# Patient Record
Sex: MALE | Race: WHITE | NOT HISPANIC OR LATINO | Employment: FULL TIME | URBAN - METROPOLITAN AREA
[De-identification: names, ages, dates, MRNs, and addresses within clinical notes are randomized per-mention and may not be internally consistent; named-entity substitution may affect disease eponyms.]

---

## 2019-01-20 ENCOUNTER — DOCUMENTATION (OUTPATIENT)
Dept: URGENT CARE | Facility: MEDICAL CENTER | Age: 39
End: 2019-01-20

## 2019-01-20 ENCOUNTER — AMB VIDEO VISIT (OUTPATIENT)
Dept: OTHER | Facility: HOSPITAL | Age: 39
End: 2019-01-20
Payer: COMMERCIAL

## 2019-01-20 DIAGNOSIS — B02.9 HERPES ZOSTER WITHOUT COMPLICATION: Primary | ICD-10-CM

## 2019-01-20 PROCEDURE — 99201 PR OFFICE OUTPATIENT NEW 10 MINUTES: CPT | Performed by: PHYSICIAN ASSISTANT

## 2019-01-20 RX ORDER — VALACYCLOVIR HYDROCHLORIDE 1 G/1
1000 TABLET, FILM COATED ORAL 3 TIMES DAILY
Qty: 21 TABLET | Refills: 0 | Status: SHIPPED | OUTPATIENT
Start: 2019-01-20 | End: 2019-01-27

## 2019-01-20 NOTE — PROGRESS NOTES
[facility]        NAME: Mary Lou Rosales is a 41 y o  male  : 1980    MRN: [id number]  DATE: 2019  TIME: 12:20 PM    Assessment and Plan   Herpes zoster without complication [B34 8]  1  Herpes zoster without complication  valACYclovir (VALTREX) 1,000 mg tablet         Patient Instructions       Follow up with PCP in 3-5 days  Proceed to  ER if symptoms worsen  Chief Complaint   No chief complaint on file  History of Present Illness       25-year-old male presents with rash on left upper chest area  Symptoms started couple days ago and have been persistent  Reports rashes starts around on as axillary and wraps around onto his front of his chest   Reports that it does not cross midline  Is painful and irritated  Denies any fevers chills nausea vomiting diarrhea  Rash   This is a new problem  The current episode started yesterday  The problem has been gradually worsening since onset  The affected locations include the chest  The rash is characterized by blistering, pain and redness  He was exposed to nothing  Pertinent negatives include no cough, fever or shortness of breath  Past treatments include nothing  The treatment provided no relief  Review of Systems   Review of Systems   Constitutional: Negative  Negative for fever  HENT: Negative  Eyes: Negative  Respiratory: Negative  Negative for cough and shortness of breath  Cardiovascular: Negative  Gastrointestinal: Negative  Musculoskeletal: Negative  Skin: Positive for rash  Neurological: Negative            Current Medications       Current Outpatient Prescriptions:     valACYclovir (VALTREX) 1,000 mg tablet, Take 1 tablet (1,000 mg total) by mouth 3 (three) times a day for 7 days, Disp: 21 tablet, Rfl: 0    Current Allergies     Allergies as of 2019    (Not on File)            The following portions of the patient's history were reviewed and updated as appropriate: allergies, current medications, past family history, past medical history, past social history, past surgical history and problem list      No past medical history on file  No past surgical history on file  No family history on file  Medications have been verified  Objective   There were no vitals taken for this visit  Physical Exam     Physical Exam   Constitutional:   Unable to do video visit because camera was not working on their and  Patient sent me pictures of rash on chest and axilla area  Neurological: He is alert  Skin: Rash (Erythematous red rash that appears to be ulcerating that starts in left posterior axilla lower area and wraps around to anterior chest   Does not cross midline ) noted  Psychiatric: He has a normal mood and affect   His behavior is normal  Judgment and thought content normal

## 2019-01-20 NOTE — CARE ANYWHERE EVISITS
Visit Summary for Kelvin Pedraza - Gender: Male - Date of Birth: [id number]  Date: [id number] - Duration: 6 minutes  Patient: Kelvin Pedraza  Provider: Vera Isabel PA-C    Patient Contact Information  Address  Ludivina Valdez; [address] [place] [address]  [phone]    Visit Topics  Rash [Added By: Self - 2019-01-20]    Conversation Transcripts     [Notification] You are connected with Vera Isabel PA-C, Urgent Care Specialist  [Notification] Kelvin Pedraza is located in [place]  [Notification] Kelvin Pedraza has shared health history         Diagnosis    Procedures  Value: [id number] Code: CPT-4 UNLISTED E&M SERVICE    Medications Prescribed    No prescriptions ordered    Electronically signed by: Ulises Butler PA-C(NPI [id number])

## 2022-06-21 ENCOUNTER — OFFICE VISIT (OUTPATIENT)
Dept: GASTROENTEROLOGY | Facility: CLINIC | Age: 42
End: 2022-06-21
Payer: COMMERCIAL

## 2022-06-21 VITALS
BODY MASS INDEX: 25.62 KG/M2 | HEART RATE: 82 BPM | OXYGEN SATURATION: 98 % | SYSTOLIC BLOOD PRESSURE: 120 MMHG | DIASTOLIC BLOOD PRESSURE: 70 MMHG | HEIGHT: 71 IN | WEIGHT: 183 LBS | RESPIRATION RATE: 18 BRPM | TEMPERATURE: 97.1 F

## 2022-06-21 DIAGNOSIS — K75.81 NASH (NONALCOHOLIC STEATOHEPATITIS): Primary | ICD-10-CM

## 2022-06-21 DIAGNOSIS — R79.89 ELEVATED FERRITIN: ICD-10-CM

## 2022-06-21 DIAGNOSIS — R79.89 ELEVATED LFTS: ICD-10-CM

## 2022-06-21 DIAGNOSIS — Z83.79 FAMILY HISTORY OF CELIAC DISEASE: ICD-10-CM

## 2022-06-21 PROCEDURE — 99204 OFFICE O/P NEW MOD 45 MIN: CPT | Performed by: INTERNAL MEDICINE

## 2022-06-21 NOTE — PROGRESS NOTES
[facility] - Outpatient Consultation  Alondra Thomas 37 y o  male MRN: [id number]  Encounter: [id number]          ASSESSMENT AND PLAN:      50-year-old male with family history of celiac disease here to discuss his blood test results and evaluation elevated liver enzymes  He has elevated transaminases and bilirubin dating as far as 2013  Transaminases have slightly improved but has not normalized  Workup for hepatitis A, B and C were negative  Smooth muscle antibody and anti liver/kidney microsomal antibody are negative  Normal alpha-1 antitrypsin level  Iron sat 35% but elevated ferritin to 558  Ultrasound showed diffuse fatty infiltrate and CBD of 3 mm  His BMI is 25 5  He had cholecystectomy and ERCP with removal of gravels from common bile duct in 2013  Elevated liver enzymes are likely secondary to nonalcoholic steatohepatitis  Significant elevation of bilirubin is unusual in fatty liver disease unless he has underlying other underlying liver disease such as PBC, PSC or Gilbert's syndrome  I recommend complete workup  -antimitochondrial antibody and immunoglobulin levels  -repeat right upper quadrant sonogram and elastography to assess for fibrosis  -hemochromatosis gene mutation analysis  -lipid panel and hemoglobin A1c to assess for metabolic syndrome  -ceruloplasmin  -given imaging finding of fatty liver disease I recommend at least 5% body weight loss  I discussed with him that for KIRBY the recommendation is 8-10% body weight loss   -if the above workups are unremarkable I recommend MRCP to assess for [place] and consideration of liver biopsy    Vitamin-D can be considered for treatment of KIRBY  -avoid hepatotoxic medication and over-the-counter supple other than vitamins  -avoid excessive drinking of alcohol  -follow-up in 6 months  ______________________________________________________________________    HPI:  50-year-old male with family history of celiac disease including his mother here to discuss his blood work result regarding celiac disease and elevated liver enzyme  He had a negative tissue transglutaminase IgA test   His IgA level is normal   He has no significant GI symptoms except mild intermittent diarrhea  His daughter has constipation and tested negative for celiac disease  He denies bloating, gas, nausea or chronic diarrhea  Had ERCP and cholecystectomy in 2013 through [facility]   I reviewed his record  He presented to ED at that time with abdominal pain and found to have gallbladder sludge  Underwent cholecystectomy and ERCP with extraction of gravel from common bile duct  At that time he was found to have elevated ALT, AST and total bilirubin  His bilirubin has progressively trending down but not completely normalized  The same with transaminases  (In 2013 ,  and bilirubin 3 7, direct bilirubin 1 6) ALT peaked at 403 and   No abdominal pain  Normal bowel movement  Recent labs from May 31, 2022  Total bilirubin 2 7  Direct bilirubin 0 5  Alkaline phosphatase 65  AST 32  ALT 92   Total protein 6 6  Albumin 4 0    Iron 94, iron saturation 35%, TIBC 269  Ferritin 558  Hepatitis a antibody IgG and IgM negative  Hepatitis B surface antigen negative  Hepatitis-B surface antibody 90 1  Hepatitis-B core antibody total negative  Hepatitis C antibody negative  Alpha-1 antitrypsin 115  IgA 268  Tissue transglutaminase antibody 1  Hemoglobin 14 8  MCV 94  WBC 5 9  Smooth muscle antibody and liver kidney microsomal antibody negative    Abdominal ultrasound from 2020 - diffuse fatty infiltrate in the liver  CBD measured 3 mm    He denies excessive drinking of alcohol  She reports few drinks once a month  REVIEW OF SYSTEMS:    CONSTITUTIONAL: Denies any fever, chills, rigors, and weight loss  HEENT: No earache or tinnitus  Denies hearing loss or visual disturbances  CARDIOVASCULAR: No chest pain or palpitations     RESPIRATORY: Denies any cough, hemoptysis, shortness of breath or dyspnea on exertion  GASTROINTESTINAL: As noted in the History of Present Illness  GENITOURINARY: No problems with urination  Denies any hematuria or dysuria  NEUROLOGIC: No dizziness or vertigo, denies headaches  MUSCULOSKELETAL: Denies any muscle or joint pain  SKIN: Denies skin rashes or itching  ENDOCRINE: Denies excessive thirst  Denies intolerance to heat or cold  PSYCHOSOCIAL: Denies depression or anxiety  Denies any recent memory loss  Historical Information   History reviewed  No pertinent past medical history  History reviewed  No pertinent surgical history  Social History   Social History     Substance and Sexual Activity   Alcohol Use Yes    Comment: Occasionly     Social History     Substance and Sexual Activity   Drug Use Never     Social History     Tobacco Use   Smoking Status Never Smoker   Smokeless Tobacco Never Used     History reviewed  No pertinent family history  Meds/Allergies       Current Outpatient Medications:     valACYclovir (VALTREX) 1,000 mg tablet    No Known Allergies        Objective     Blood pressure 120/70, pulse 82, temperature (!) 97 1 °F (36 2 °C), temperature source Tympanic, resp  rate 18, height 5' 11" (1 803 m), weight 83 kg (183 lb), SpO2 98 %  Body mass index is 25 52 kg/m²  PHYSICAL EXAM:      General Appearance:   Alert, cooperative, no distress   HEENT:   Normocephalic, atraumatic, anicteric      Neck:  Supple, symmetrical, trachea midline   Lungs:   Clear to auscultation bilaterally; no rales, rhonchi or wheezing; respirations unlabored    Heart[de-identified]   Regular rate and rhythm; no murmur, rub, or gallop     Abdomen:   Soft, non-tender, non-distended; normal bowel sounds; no masses, no organomegaly    Genitalia:   Deferred    Rectal:   Deferred    Extremities:  No cyanosis, clubbing or edema    Pulses:  2+ and symmetric    Skin:  No jaundice, rashes, or lesions    Lymph nodes:  No palpable cervical lymphadenopathy        Lab Results:   see above      Radiology Results:   See above

## 2022-07-29 ENCOUNTER — HOSPITAL ENCOUNTER (OUTPATIENT)
Dept: ULTRASOUND IMAGING | Facility: HOSPITAL | Age: 42
Discharge: HOME | End: 2022-07-29
Attending: INTERNAL MEDICINE
Payer: COMMERCIAL

## 2022-07-29 DIAGNOSIS — R79.89 ELEVATED LFTS: ICD-10-CM

## 2022-07-29 PROCEDURE — 76981 USE PARENCHYMA: CPT

## 2022-07-29 PROCEDURE — 76705 ECHO EXAM OF ABDOMEN: CPT

## 2022-08-15 DIAGNOSIS — R79.89 ELEVATED LFTS: Primary | ICD-10-CM

## 2022-08-15 DIAGNOSIS — K75.81 NASH (NONALCOHOLIC STEATOHEPATITIS): ICD-10-CM

## 2022-12-09 ENCOUNTER — HOSPITAL ENCOUNTER (OUTPATIENT)
Dept: MRI IMAGING | Facility: HOSPITAL | Age: 42
Discharge: HOME | End: 2022-12-09
Attending: INTERNAL MEDICINE

## 2022-12-09 DIAGNOSIS — K75.81 NASH (NONALCOHOLIC STEATOHEPATITIS): ICD-10-CM

## 2022-12-09 DIAGNOSIS — R79.89 ELEVATED LFTS: ICD-10-CM

## 2022-12-09 RX ADMIN — GADOBUTROL 8 ML: 604.72 INJECTION INTRAVENOUS at 16:31

## 2022-12-29 ENCOUNTER — TELEPHONE (OUTPATIENT)
Dept: OTHER | Facility: OTHER | Age: 42
End: 2022-12-29

## 2022-12-29 DIAGNOSIS — E83.00 LOW CERULOPLASMIN LEVEL: Primary | ICD-10-CM

## 2023-01-04 LAB
COPPER 24H UR-MRATE: 14 MCG/24 H (ref 15–60)
SPECIMEN VOL 24H UR: 2050 ML